# Patient Record
Sex: FEMALE | Race: WHITE
[De-identification: names, ages, dates, MRNs, and addresses within clinical notes are randomized per-mention and may not be internally consistent; named-entity substitution may affect disease eponyms.]

---

## 2019-02-14 PROBLEM — Z00.00 ENCOUNTER FOR PREVENTIVE HEALTH EXAMINATION: Status: ACTIVE | Noted: 2019-02-14

## 2019-02-22 ENCOUNTER — APPOINTMENT (OUTPATIENT)
Dept: VASCULAR SURGERY | Facility: CLINIC | Age: 84
End: 2019-02-22
Payer: MEDICARE

## 2019-02-22 VITALS
HEIGHT: 60 IN | WEIGHT: 121 LBS | DIASTOLIC BLOOD PRESSURE: 69 MMHG | SYSTOLIC BLOOD PRESSURE: 164 MMHG | HEART RATE: 73 BPM | BODY MASS INDEX: 23.75 KG/M2

## 2019-02-22 DIAGNOSIS — Z78.9 OTHER SPECIFIED HEALTH STATUS: ICD-10-CM

## 2019-02-22 DIAGNOSIS — Z86.39 PERSONAL HISTORY OF OTHER ENDOCRINE, NUTRITIONAL AND METABOLIC DISEASE: ICD-10-CM

## 2019-02-22 DIAGNOSIS — Z86.79 PERSONAL HISTORY OF OTHER DISEASES OF THE CIRCULATORY SYSTEM: ICD-10-CM

## 2019-02-22 DIAGNOSIS — Z87.891 PERSONAL HISTORY OF NICOTINE DEPENDENCE: ICD-10-CM

## 2019-02-22 DIAGNOSIS — I70.8 ATHEROSCLEROSIS OF OTHER ARTERIES: ICD-10-CM

## 2019-02-22 PROCEDURE — 93923 UPR/LXTR ART STDY 3+ LVLS: CPT

## 2019-02-22 PROCEDURE — 99214 OFFICE O/P EST MOD 30 MIN: CPT

## 2019-02-22 RX ORDER — METOPROLOL TARTRATE 1 MG/ML
INJECTION, SOLUTION INTRAVENOUS
Refills: 0 | Status: ACTIVE | COMMUNITY

## 2019-02-22 RX ORDER — ASPIRIN 81 MG/1
81 TABLET, COATED ORAL
Refills: 0 | Status: ACTIVE | COMMUNITY

## 2019-02-22 RX ORDER — SIMVASTATIN 80 MG/1
TABLET, FILM COATED ORAL
Refills: 0 | Status: ACTIVE | COMMUNITY

## 2019-02-22 RX ORDER — LISINOPRIL 30 MG/1
TABLET ORAL
Refills: 0 | Status: ACTIVE | COMMUNITY

## 2019-02-22 RX ORDER — FUROSEMIDE 80 MG/1
TABLET ORAL
Refills: 0 | Status: ACTIVE | COMMUNITY

## 2019-02-22 NOTE — PHYSICAL EXAM
[Normal Breath Sounds] : Normal breath sounds [1+] : right 1+ [0] : right 0 [2+] : left 2+ [Ankle Swelling (On Exam)] : present [Ankle Swelling Bilaterally] : bilaterally  [] : bilaterally [Ankle Swelling On The Right] : mild [Alert] : alert [Oriented to Person] : oriented to person [Oriented to Place] : oriented to place [Calm] : calm [de-identified] : NAD [de-identified] : NCAT [de-identified] : supple [FreeTextEntry1] : bilateral audible DP / PT signals [de-identified] : soft, NT, ND [de-identified] : right medial malleolar healed ulceration

## 2019-02-22 NOTE — HISTORY OF PRESENT ILLNESS
[FreeTextEntry1] : 89 year old female referred by Dr. Zarate for arterial evaluation.\par She was initially referred to the wound care clinic to Dr. Zarate for evaluation and treatment of right lower leg venous stasis ulcer.  The ulcers first formed early January, she was treated with sliver alginate dressings and light compression. A preliminary arterial evaluation was performed at the [facility] which revealed R 0.45 and L 0.77. There was a question of whether compression could be used in treatment of these venous stasis ulcers.  Her ulcers healed over a 3-4 week span.\par \par She is minimally ambulatory. No claudication symptoms although she does not walk much.\par No hx of CAD. Non smoker.

## 2023-06-06 NOTE — ASSESSMENT
[FreeTextEntry1] : 89 y/o F w/ right medial malleolar venous stasis ulcer on background of PAD. Ulcer healed with mild compression therapy.\par She will continue the use of mild 8-15mm hg compression garments and elevate her legs as much as possible.\par She will follow up with Dr. Zarate in the wound care clinic.
impairments found/functional limitations in following categories/risk reduction/prevention/rehab potential/therapy frequency/predicted duration of therapy intervention/anticipated equipment needs at discharge/anticipated discharge recommendation